# Patient Record
Sex: FEMALE | Race: WHITE | NOT HISPANIC OR LATINO | Employment: FULL TIME | URBAN - METROPOLITAN AREA
[De-identification: names, ages, dates, MRNs, and addresses within clinical notes are randomized per-mention and may not be internally consistent; named-entity substitution may affect disease eponyms.]

---

## 2021-04-06 ENCOUNTER — OFFICE VISIT (OUTPATIENT)
Dept: GASTROENTEROLOGY | Facility: CLINIC | Age: 41
End: 2021-04-06

## 2021-04-06 VITALS
DIASTOLIC BLOOD PRESSURE: 79 MMHG | HEART RATE: 65 BPM | SYSTOLIC BLOOD PRESSURE: 113 MMHG | WEIGHT: 272.2 LBS | TEMPERATURE: 97.1 F

## 2021-04-06 DIAGNOSIS — K51.90 ULCERATIVE COLITIS WITHOUT COMPLICATIONS, UNSPECIFIED LOCATION (HCC): Primary | ICD-10-CM

## 2021-04-06 DIAGNOSIS — K31.84 GASTROPARESIS: ICD-10-CM

## 2021-04-06 DIAGNOSIS — R11.2 CANNABINOID HYPEREMESIS SYNDROME: ICD-10-CM

## 2021-04-06 DIAGNOSIS — F12.90 CANNABINOID HYPEREMESIS SYNDROME: ICD-10-CM

## 2021-04-06 PROCEDURE — 99204 OFFICE O/P NEW MOD 45 MIN: CPT | Performed by: INTERNAL MEDICINE

## 2021-04-06 RX ORDER — PROMETHAZINE HYDROCHLORIDE 25 MG/1
TABLET ORAL AS NEEDED
COMMUNITY
Start: 2020-12-29

## 2021-04-06 RX ORDER — OXCARBAZEPINE 300 MG/1
300 TABLET, FILM COATED ORAL 2 TIMES DAILY
COMMUNITY
Start: 2021-03-26

## 2021-04-06 RX ORDER — ESCITALOPRAM OXALATE 20 MG/1
20 TABLET ORAL DAILY
COMMUNITY
Start: 2021-03-04

## 2021-04-06 RX ORDER — QUETIAPINE FUMARATE 100 MG/1
100 TABLET, FILM COATED ORAL 2 TIMES DAILY
COMMUNITY
Start: 2020-12-29

## 2021-04-06 NOTE — PROGRESS NOTES
GASTROENTEROLOGY OFFICE NOTE  Yaritza Madden  [id number]  1980           CARE TEAM  Patient Care Team:  Natalia Cha APRN as PCP - General (Nurse Practitioner)    TAN Leong     Chief Complaint   Patient presents with   • Ulcerative Colitis   • Diarrhea   • Gastroparesis   • Nausea   • Vomiting        HISTORY OF PRESENT ILLNESS:  First visit for this very pleasant 40-year-old white female presents with a history of ulcerative colitis.  She has a history of diarrhea and also gives a history of gastroparesis.    She does give a history of consistent cannabis use and does report that she feels a lot better when she takes a very hot shower.  She denies dysphagia solids or odynophagia she denies early satiety or unexplained weight loss and denies melena/bright red blood per rectum.    The details of her history of ulcerative colitis are unclear and specifically she is not been on biological therapy, prednisone or mesalamine based therapy.  She does have problems with chronic nonbloody diarrhea.  Diarrhea has been an issue for about 4 years on and off as she takes Imodium to help with this.    She status post CAT scan September 15, 2020 which revealed no intra-abdominal pathology.  Evidence of prior hysterectomy was noted.  There is no bowel wall thickening or inflammatory changes.  Moderate splenomegaly was incidentally noted    PAST MEDICAL HISTORY  Past Medical History:   Diagnosis Date   • Gastroparesis    • History of uterine cancer    • Ulcerative colitis (CMS/HCC)         PAST SURGICAL HISTORY  Past Surgical History:   Procedure Laterality Date   • EXCISION / BIOPSY BREAST / NIPPLE / DUCT     • HYSTERECTOMY     • TONSILLECTOMY AND ADENOIDECTOMY  1992        MEDICATIONS:    Current Outpatient Medications:   •  escitalopram (LEXAPRO) 20 MG tablet, Take 20 mg by mouth Daily., Disp: , Rfl:   •  OXcarbazepine (TRILEPTAL) 300 MG tablet, Take 300 mg by mouth 2 (Two) Times a Day., Disp: , Rfl:   •   promethazine (PHENERGAN) 25 MG tablet, As Needed., Disp: , Rfl:   •  QUEtiapine (SEROquel) 100 MG tablet, Take 100 mg by mouth 2 (two) times a day., Disp: , Rfl:     ALLERGIES  Allergies   Allergen Reactions   • Erythromycin GI Intolerance   • Penicillin G GI Intolerance   • Sulfa Antibiotics GI Intolerance       FAMILY HISTORY:  Family History   Problem Relation Age of Onset   • Hepatitis Mother    • Osteoporosis Mother    • Diabetes Father    • Colon polyps Neg Hx    • Colon cancer Neg Hx        SOCIAL HISTORY  Social History     Socioeconomic History   • Marital status: Single     Spouse name: Not on file   • Number of children: Not on file   • Years of education: Not on file   • Highest education level: Not on file   Tobacco Use   • Smoking status: Current Every Day Smoker     Packs/day: 1.00   • Smokeless tobacco: Never Used   Vaping Use   • Vaping Use: Never used   • Passive vaping exposure Yes   Substance and Sexual Activity   • Alcohol use: Never   • Drug use: Yes     Types: Marijuana   • Sexual activity: Defer     Socioeconomic History:  She is single and has a 17-year-old son.  She does not abuse alcohol but she does use marijuana on a daily basis.  She works at a [employer]       REVIEW OF SYSTEMS  Review of Systems   Constitutional: Positive for activity change, appetite change, chills, diaphoresis and fatigue. Negative for fever, unexpected weight gain and unexpected weight loss.   HENT: Positive for congestion and rhinorrhea. Negative for dental problem, drooling, ear discharge, ear pain, facial swelling, hearing loss, mouth sores, nosebleeds, postnasal drip, sinus pressure, sneezing, sore throat, swollen glands, tinnitus, trouble swallowing and voice change.    Respiratory: Negative for apnea, cough, choking, chest tightness, shortness of breath, wheezing and stridor.    Cardiovascular: Negative for chest pain, palpitations and leg swelling.   Gastrointestinal: Positive for abdominal distention, abdominal  pain, constipation, diarrhea, nausea and vomiting. Negative for anal bleeding, blood in stool, rectal pain, GERD and indigestion.   Endocrine: Positive for cold intolerance and polydipsia. Negative for heat intolerance, polyphagia and polyuria.   Musculoskeletal: Positive for back pain. Negative for arthralgias, gait problem, joint swelling, myalgias, neck pain, neck stiffness and bursitis.   Allergic/Immunologic: Negative for environmental allergies, food allergies and immunocompromised state.   Neurological: Negative for dizziness, tremors, seizures, facial asymmetry, speech difficulty, weakness, light-headedness, numbness and confusion.   Hematological: Negative for adenopathy. Does not bruise/bleed easily.   Psychiatric/Behavioral: Positive for decreased concentration, sleep disturbance and stress. Negative for agitation, behavioral problems, dysphoric mood, hallucinations, self-injury, suicidal ideas, negative for hyperactivity and depressed mood. The patient is nervous/anxious.      I reviewed the above-noted review of systems and the pertinent/active issues are those noted today's his present illness.    PHYSICAL EXAM   /79 (BP Location: Left arm, Patient Position: Sitting, Cuff Size: Large Adult)   Pulse 65   Temp 97.1 °F (36.2 °C) (Temporal)   Wt 123 kg (272 lb 3.2 oz)   General: Alert and oriented x 3. In no apparent or acute distress.  and No stigmata of chronic liver disease  HEENT: Anicteric sclerae. Normal oropharynx  Neck: Supple. Without lymphadenopathy  CV: Regular rate and rhythm, S1, S2  Lungs: Clear to ausculation. Without rales, rhonchi and wheezing  Abdomen:  Soft,non-distended without palpable masses or hepatosplenomeagaly, areas of rebound tenderness or guarding.   Extremeties: without clubbing, cyanosis or edema  Neurologic:  Alert and oriented x 3 without focal motor or sensory deficits  Rectal exam: deferred         Results Review:  I reviewed the patient's new clinical  results.  Old records reviewed      ASSESSMENT  1.-Reported history of ulcerative colitis.  Reassessment, endoscopic, recommended for clarification and reestablishment of this diagnosis so that appropriate therapy could be initiated.  There are certainly some data deficit and is unclear to me whether she truly has ulcerative colitis  2.-Cannabis induced hyperemesis syndrome.  Discontinuation of cannabis recommended.  This is contributing to her gastroparesis and may be actually the cause.  She feels that she may very well be able to discontinue cannabis but is not entirely sure.  3.-Gastroparesis.  See above    PLAN  1.-Schedule colonoscopy for exclusion of inflammatory bowel disease  2.-Discontinue cannabis  3.-Schedule EGD for exclusion of eosinophilic esophagitis, erosive esophagitis, chronic gastritis, celiac disease  4.-Further recommendation deferred pending findings of endoscopic studies.      Rob Couch MD  4/6/2021   14:55 EDT

## 2021-04-09 PROBLEM — K51.90 ULCERATIVE COLITIS WITHOUT COMPLICATIONS (HCC): Status: ACTIVE | Noted: 2021-04-09

## 2021-04-09 PROBLEM — K31.84 GASTROPARESIS: Status: ACTIVE | Noted: 2021-04-09

## 2021-04-09 PROBLEM — F12.90 CANNABINOID HYPEREMESIS SYNDROME: Status: ACTIVE | Noted: 2021-04-09

## 2021-04-09 PROBLEM — R11.2 CANNABINOID HYPEREMESIS SYNDROME: Status: ACTIVE | Noted: 2021-04-09

## 2021-05-18 ENCOUNTER — OUTSIDE FACILITY SERVICE (OUTPATIENT)
Dept: GASTROENTEROLOGY | Facility: CLINIC | Age: 41
End: 2021-05-18

## 2021-06-11 RX ORDER — SODIUM, POTASSIUM,MAG SULFATES 17.5-3.13G
2 SOLUTION, RECONSTITUTED, ORAL ORAL TAKE AS DIRECTED
Qty: 354 ML | Refills: 0 | Status: SHIPPED | OUTPATIENT
Start: 2021-06-11